# Patient Record
Sex: MALE | Race: WHITE
[De-identification: names, ages, dates, MRNs, and addresses within clinical notes are randomized per-mention and may not be internally consistent; named-entity substitution may affect disease eponyms.]

---

## 2019-12-05 ENCOUNTER — HOSPITAL ENCOUNTER (EMERGENCY)
Dept: HOSPITAL 8 - ED | Age: 64
Discharge: HOME | End: 2019-12-05
Payer: COMMERCIAL

## 2019-12-05 VITALS — BODY MASS INDEX: 25.22 KG/M2 | HEIGHT: 64 IN | WEIGHT: 147.71 LBS

## 2019-12-05 VITALS — DIASTOLIC BLOOD PRESSURE: 74 MMHG | SYSTOLIC BLOOD PRESSURE: 106 MMHG
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
## 2019-12-05 DIAGNOSIS — S46.911A: Primary | ICD-10-CM
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
## 2019-12-05 DIAGNOSIS — F17.200: ICD-10-CM

## 2019-12-05 PROCEDURE — 96372 THER/PROPH/DIAG INJ SC/IM: CPT

## 2019-12-05 PROCEDURE — 73030 X-RAY EXAM OF SHOULDER: CPT

## 2019-12-05 PROCEDURE — 99283 EMERGENCY DEPT VISIT LOW MDM: CPT

## 2020-12-04 ENCOUNTER — HOSPITAL ENCOUNTER (EMERGENCY)
Dept: HOSPITAL 8 - ED | Age: 65
Discharge: HOME | End: 2020-12-04
Payer: MEDICARE

## 2020-12-04 VITALS — SYSTOLIC BLOOD PRESSURE: 121 MMHG | DIASTOLIC BLOOD PRESSURE: 82 MMHG

## 2020-12-04 VITALS — BODY MASS INDEX: 27.29 KG/M2 | HEIGHT: 65 IN | WEIGHT: 163.8 LBS

## 2020-12-04 DIAGNOSIS — J44.9: ICD-10-CM

## 2020-12-04 DIAGNOSIS — L98.9: ICD-10-CM

## 2020-12-04 DIAGNOSIS — R22.1: Primary | ICD-10-CM

## 2020-12-04 PROCEDURE — 99281 EMR DPT VST MAYX REQ PHY/QHP: CPT

## 2020-12-04 NOTE — NUR
NEW GAUZE/TAPE DRESSING APPLIED TO ABSCESS ON L NECK. PT STATES HE HAS 
ADDITIONAL WOUND CARE SUPPLIES AT HOME.

## 2020-12-04 NOTE — NUR
D/C INSTRUCTIONS & F/U APPT RV'WD WITH PT, HE VERBALIZES UNDERSTANDING. PT 
AMBULATED OUT OF ED WITHOUT DIFFICULTY.

## 2021-03-22 ENCOUNTER — HOSPITAL ENCOUNTER (EMERGENCY)
Dept: HOSPITAL 8 - ED | Age: 66
Discharge: HOME | End: 2021-03-22
Payer: MEDICARE

## 2021-03-22 VITALS — DIASTOLIC BLOOD PRESSURE: 87 MMHG | SYSTOLIC BLOOD PRESSURE: 122 MMHG

## 2021-03-22 VITALS — HEIGHT: 65 IN | WEIGHT: 167.55 LBS | BODY MASS INDEX: 27.92 KG/M2

## 2021-03-22 DIAGNOSIS — L02.413: Primary | ICD-10-CM

## 2021-03-22 DIAGNOSIS — J44.9: ICD-10-CM

## 2021-03-22 DIAGNOSIS — F17.200: ICD-10-CM

## 2021-03-22 PROCEDURE — 99283 EMERGENCY DEPT VISIT LOW MDM: CPT

## 2021-03-22 NOTE — NUR
66 yo m with complaints of "INFECTION ON MY RIGHT SHOULDER" PT REPORTS HE'S HAD 
A RECURRENT INFECTION FROM 8/2020. PT REPORTS HE SEES ORTHO MD BUT NEEDS 
INFECTION TO BE TREATED PRIOR TO SURGERY. PT TO GET PERSCRIPTION FOR 
ANTIBIOTICS AND BE D/C

## 2021-06-18 ENCOUNTER — HOSPITAL ENCOUNTER (EMERGENCY)
Dept: HOSPITAL 8 - ED | Age: 66
Discharge: HOME | End: 2021-06-18
Payer: MEDICARE

## 2021-06-18 VITALS — WEIGHT: 167.11 LBS | HEIGHT: 65 IN | BODY MASS INDEX: 27.84 KG/M2

## 2021-06-18 VITALS — DIASTOLIC BLOOD PRESSURE: 78 MMHG | SYSTOLIC BLOOD PRESSURE: 108 MMHG

## 2021-06-18 DIAGNOSIS — J44.9: ICD-10-CM

## 2021-06-18 DIAGNOSIS — M25.511: Primary | ICD-10-CM

## 2021-06-18 DIAGNOSIS — I10: ICD-10-CM

## 2021-06-18 DIAGNOSIS — Z96.611: ICD-10-CM

## 2021-06-18 PROCEDURE — 99281 EMR DPT VST MAYX REQ PHY/QHP: CPT
